# Patient Record
Sex: MALE | Race: BLACK OR AFRICAN AMERICAN | NOT HISPANIC OR LATINO | Employment: OTHER | URBAN - METROPOLITAN AREA
[De-identification: names, ages, dates, MRNs, and addresses within clinical notes are randomized per-mention and may not be internally consistent; named-entity substitution may affect disease eponyms.]

---

## 2019-06-24 PROBLEM — C61 PROSTATE CANCER: Status: ACTIVE | Noted: 2019-06-24

## 2019-06-24 PROBLEM — N52.9 ERECTILE DYSFUNCTION: Status: ACTIVE | Noted: 2019-06-24

## 2019-06-24 PROBLEM — C67.9 BLADDER CANCER: Status: ACTIVE | Noted: 2019-06-24

## 2019-10-01 PROBLEM — I10 ESSENTIAL HYPERTENSION: Status: ACTIVE | Noted: 2019-10-01

## 2019-10-01 PROBLEM — Z79.4 TYPE 2 DIABETES MELLITUS WITH COMPLICATION, WITH LONG-TERM CURRENT USE OF INSULIN: Status: ACTIVE | Noted: 2019-10-01

## 2019-10-01 PROBLEM — N18.30 CKD (CHRONIC KIDNEY DISEASE) STAGE 3, GFR 30-59 ML/MIN: Status: ACTIVE | Noted: 2019-10-01

## 2019-10-01 PROBLEM — E11.8 TYPE 2 DIABETES MELLITUS WITH COMPLICATION, WITH LONG-TERM CURRENT USE OF INSULIN: Status: ACTIVE | Noted: 2019-10-01

## 2019-10-01 LAB
ALBUMIN: 3.9 G/DL (ref 3.4–5)
ALP ISOS SERPL LEV INH-CCNC: 130 U/L (ref 45–117)
ALT (SGPT): 23 U/L (ref 16–61)
ANION GAP SERPL CALC-SCNC: 5 MMOL/L (ref 4–14)
AST SERPL-CCNC: 22 U/L (ref 15–37)
BILIRUB SERPL-MCNC: 0.9 MG/DL (ref 0.2–1)
BUN SERPL-MCNC: 23 MG/DL (ref 7–18)
BUN/CREAT RATIO: 10.7
CALCIUM SERPL-MCNC: 8.7 MG/DL (ref 8.5–10.1)
CHLORIDE SERPL-SCNC: 103 MMOL/L (ref 98–107)
CO2 SERPL-SCNC: 29 MMOL/L (ref 21–32)
CREAT SERPL-MCNC: 2.15 MG/DL (ref 0.6–1.3)
GFR MDRD AF AMER: 39 ML/MIN
GFR MDRD NON AF AMER: 32 ML/MIN
GLUCOSE: 143 MG/DL (ref 74–106)
POTASSIUM: 4 MMOL/L (ref 3.5–5.1)
PROSTATE SPECIFIC ANTIGEN, TOTAL: 0.01 NG/ML (ref 0.01–4)
PSA FREE MFR SERPL: NORMAL %
PSA FREE SERPL-MCNC: <0.02 NG/ML
SODIUM: 137 MMOL/L (ref 136–145)
TOTAL PROTEIN: 8.5 G/DL (ref 6.4–8.2)

## 2022-03-15 PROBLEM — N18.31 STAGE 3A CHRONIC KIDNEY DISEASE: Status: ACTIVE | Noted: 2019-10-01

## 2023-10-17 ENCOUNTER — SOCIAL WORK (OUTPATIENT)
Dept: ADMINISTRATIVE | Facility: OTHER | Age: 58
End: 2023-10-17
Payer: MEDICAID

## 2023-10-17 NOTE — PROGRESS NOTES
SW received and reviewed consult placed by Jaylen Marsh MD. However, consult was blank with no rationale or specific orders. SW messaged MD via Secure Chat to obtain additional information and see what need(s) pt may have. SW awaiting response from MD. SW will provided assistance as needed.     Raul Mabry, MSW    [phone]

## 2023-10-17 NOTE — PROGRESS NOTES
RUT received a response from Dr. Marsh inquiring about a BP cuff for pt. RUT informed BP cuffs are not covered by insurance. However, if pt has a diagnosis of hypertension, an amb consult to digital medicine can be placed, if pt would be willing to participate. Pt would then be provided a BP cuff to monitor BP. MD thanked RUT. No other needs at this time.     ASHLY Leyva    [phone] (phone)  [phone] (fax)